# Patient Record
Sex: MALE | NOT HISPANIC OR LATINO | URBAN - METROPOLITAN AREA
[De-identification: names, ages, dates, MRNs, and addresses within clinical notes are randomized per-mention and may not be internally consistent; named-entity substitution may affect disease eponyms.]

---

## 2019-04-15 ENCOUNTER — IMPORTED ENCOUNTER (OUTPATIENT)
Dept: URBAN - METROPOLITAN AREA CLINIC 1 | Facility: CLINIC | Age: 79
End: 2019-04-15

## 2019-04-15 PROBLEM — H35.371: Noted: 2019-04-15

## 2019-04-15 PROBLEM — H25.813: Noted: 2019-04-15

## 2019-04-15 PROBLEM — H43.813: Noted: 2019-04-15

## 2019-04-15 PROCEDURE — 92015 DETERMINE REFRACTIVE STATE: CPT

## 2019-04-15 PROCEDURE — 92004 COMPRE OPH EXAM NEW PT 1/>: CPT

## 2019-04-15 NOTE — PATIENT DISCUSSION
1.  Cataract OU:  Visually Significant secondary to glare discussed the risks benefits alternatives and limitations of cataract surgery. The patient stated a full understanding and a desire to proceed with the procedure. The patient will need to return for preop appointment with cataract measurements and to have any additional questions answered and start pre-operative eye drops as directed. **Not MF candidate. **Likely Myopic Goal Phaco PCL OS then OD Otherwise follow-up 6 months 10/DFE/glare 2. Epiretinal Membrane OD - Observe for change. 3. PVD OU - RD precautions. Return for an appointment for Ascan/H and P. with Dr. Sarah Braun.

## 2019-04-29 ENCOUNTER — IMPORTED ENCOUNTER (OUTPATIENT)
Dept: URBAN - METROPOLITAN AREA CLINIC 1 | Facility: CLINIC | Age: 79
End: 2019-04-29

## 2019-04-29 PROBLEM — H25.813: Noted: 2019-04-29

## 2019-04-29 PROCEDURE — 92136 OPHTHALMIC BIOMETRY: CPT

## 2019-04-29 NOTE — PATIENT DISCUSSION
1. Cataract OU:  Visually Significant secondary to glare discussed the risks benefits alternatives and limitations of cataract surgery. The patient stated a full understanding and a desire to proceed with the procedure. Instructed/ discussed with patient if gtts are expensive (over 150 dollars) to call our office so we can recommend alternatives. Pt understood. Pt understands they will need glasses post-op to achieve their best corrected vision. Pt requests myopic target. Phaco PCL OS then OD. 2.  Epiretinal Membrane OD - Observe for change. 3. PVD OU - RD precautions.

## 2019-05-08 ENCOUNTER — IMPORTED ENCOUNTER (OUTPATIENT)
Dept: URBAN - METROPOLITAN AREA CLINIC 1 | Facility: CLINIC | Age: 79
End: 2019-05-08

## 2019-05-09 ENCOUNTER — IMPORTED ENCOUNTER (OUTPATIENT)
Dept: URBAN - METROPOLITAN AREA CLINIC 1 | Facility: CLINIC | Age: 79
End: 2019-05-09

## 2019-05-09 PROBLEM — Z96.1: Noted: 2019-05-09

## 2019-05-09 PROCEDURE — 99024 POSTOP FOLLOW-UP VISIT: CPT

## 2019-05-09 NOTE — PATIENT DISCUSSION
POD#1 CE/IOL OS (Toric) doing well. *Myopic Goal. 1 gtt Combigan instilled  Continue all 3 gtts as prescribed and until gone. Use Ilevro BID OS Ilevro Qdaily OS Ocuflox TID OS Post op Warnings Reiterated RTC as scheduled

## 2019-05-16 ENCOUNTER — IMPORTED ENCOUNTER (OUTPATIENT)
Dept: URBAN - METROPOLITAN AREA CLINIC 1 | Facility: CLINIC | Age: 79
End: 2019-05-16

## 2019-05-16 PROBLEM — H25.811: Noted: 2019-05-16

## 2019-05-16 PROCEDURE — 92136 OPHTHALMIC BIOMETRY: CPT

## 2019-05-16 NOTE — PATIENT DISCUSSION
1.  Cataract OD: Visually Significant secondary to glare discussed the risks benefits alternatives and limitations of cataract surgery. The patient stated a full understanding and a desire to proceed with the procedure. Pt understands they will need glasses post-op to achieve their best corrected vision. Guarded prognosis due to h/o ERM OD. Phaco PCL OD Pt understands they will need glasses post-op to achieve their best corrected vision. 2.  POW#2  CE/IOL OS (Toric) doing well.   Use Inveltys BID OS till out Use Ilevro Qdaily OS till out F/u as scheduled 2nd eye

## 2019-05-22 ENCOUNTER — IMPORTED ENCOUNTER (OUTPATIENT)
Dept: URBAN - METROPOLITAN AREA CLINIC 1 | Facility: CLINIC | Age: 79
End: 2019-05-22

## 2019-05-23 ENCOUNTER — IMPORTED ENCOUNTER (OUTPATIENT)
Dept: URBAN - METROPOLITAN AREA CLINIC 1 | Facility: CLINIC | Age: 79
End: 2019-05-23

## 2019-05-23 PROBLEM — Z96.1: Noted: 2019-05-23

## 2019-05-23 PROCEDURE — 99024 POSTOP FOLLOW-UP VISIT: CPT

## 2019-05-23 NOTE — PATIENT DISCUSSION
1. POD#1 Phaco/ PCL OD (Toric)- doing well. *Mid-Goal* Continue all 3 gtts as prescribed and until gone. Use Inveltys BID OD Ilevro Qdaily OD Ocuflox TID OD 1 gtt Combigan applied OD Post op Warnings Reiterated 2. POW#2 Phaco/ PCL OS (Toric)- doing well. *Myopic Goal* Continue all 3 gtts as prescribed and until gone. Use Inveltys BID OS till out Use Ilevro Qdaily OS till out Post op Warnings Reiterated RTC as scheduled

## 2019-06-13 ENCOUNTER — IMPORTED ENCOUNTER (OUTPATIENT)
Dept: URBAN - METROPOLITAN AREA CLINIC 1 | Facility: CLINIC | Age: 79
End: 2019-06-13

## 2019-06-13 PROBLEM — Z09: Noted: 2019-06-13

## 2019-06-13 PROCEDURE — 99024 POSTOP FOLLOW-UP VISIT: CPT

## 2019-06-13 NOTE — PATIENT DISCUSSION
POW#3 Phaco/ PCL OU (Toric OU) -- Doing well. **OD- Mid-Range Goal / OS- Myopic Goal** Finish PO meds per schedule MRX for glasses given to patient today @ [facility]. Return for an appointment in February 2020 for a 30 OU with Dr. Torrey Whelan.

## 2019-08-16 ENCOUNTER — IMPORTED ENCOUNTER (OUTPATIENT)
Dept: URBAN - METROPOLITAN AREA CLINIC 1 | Facility: CLINIC | Age: 79
End: 2019-08-16

## 2019-08-16 PROBLEM — H10.501: Noted: 2019-08-16

## 2019-08-16 PROBLEM — H01.001: Noted: 2019-08-16

## 2019-08-16 PROBLEM — H01.004: Noted: 2019-08-16

## 2019-08-16 PROCEDURE — 92012 INTRM OPH EXAM EST PATIENT: CPT

## 2019-08-16 NOTE — PATIENT DISCUSSION
1.  Blepharoconjunctivitis OD vs. early Viral Conjunctivitis OD: Likely Blepharoconjunctivitis as patient reports this condition has been recurrent throughout his life - Begin Tobradex BID OD (Rx sent to patient's pharm) Begin Hot compresses OU x5 minutes daily. Begin Baby Shampoo Lid Scrubs (Use OU) 2. Anterior Blepharitis OU - Begin Daily Hot compresses and lid scrubs were recommended. 3. POM#3 Phaco/ PCL OU (Toric OU) -- Doing well. **OD- Mid-Range Goal / OS- Myopic Goal** 4.  H/o Epiretinal Membrane OD 5. H/o PVD OU Return for an appointment in 1-2 weeks 10 (blepharoconj vs viral recheck) with Dr. Micaela Ponce.

## 2019-08-23 ENCOUNTER — IMPORTED ENCOUNTER (OUTPATIENT)
Dept: URBAN - METROPOLITAN AREA CLINIC 1 | Facility: CLINIC | Age: 79
End: 2019-08-23

## 2019-08-23 PROBLEM — H10.501: Noted: 2019-08-23

## 2019-08-23 PROCEDURE — 92012 INTRM OPH EXAM EST PATIENT: CPT

## 2019-08-23 NOTE — PATIENT DISCUSSION
1.  Blepharoconjunctivitis OD: Resolved. Continue Tobradex BID OD x1 more week then d/c OD. Continue Hot compresses QDaily-BID OU x5 minutes routinely/daily. Switch Baby Shampoo Lid Scrubs to Ocusoft Hypochlor spray lid scrubs Qdaily to eyelids OU. 2.  Anterior Blepharitis OU -  Continue Hot compresses OU x5 minutes daily. Switch Baby Shampoo Lid Scrubs to Ocusoft Hypochlor spray lid scrubs Qdaily to eyelids OU. 3.  POM#3 Phaco/ PCL OU (Toric OU)  **OD- Mid-Range Goal / OS- Myopic Goal** 4.  H/o Epiretinal Membrane OD 5. H/o PVD OUReturn for an appointment as scheduled with Dr. Natividad Broussard.

## 2020-08-13 ENCOUNTER — IMPORTED ENCOUNTER (OUTPATIENT)
Dept: URBAN - METROPOLITAN AREA CLINIC 1 | Facility: CLINIC | Age: 80
End: 2020-08-13

## 2020-08-13 PROBLEM — H26.491: Noted: 2020-08-13

## 2020-08-13 PROBLEM — H04.123: Noted: 2020-08-13

## 2020-08-13 PROBLEM — H01.001: Noted: 2020-08-13

## 2020-08-13 PROBLEM — H01.004: Noted: 2020-08-13

## 2020-08-13 PROBLEM — H16.143: Noted: 2020-08-13

## 2020-08-13 PROBLEM — H35.371: Noted: 2020-08-13

## 2020-08-13 PROCEDURE — 92014 COMPRE OPH EXAM EST PT 1/>: CPT

## 2020-08-13 NOTE — PATIENT DISCUSSION
1.  Epiretinal Membrane OD - Stable. Observe for change. 2. Anterior Blepharitis OU - Daily hot compresses OU x 5 minutes daily. Switch Baby Shampoo Lid Scrubs to Ocusoft Hypochlor spray lid scrubs Qdaily to eyelids OU. 3.  KAREN w/ PEK OU-The use/continuation of artificial tears were recommended. 4.  PCO  OD (Posterior Capsule Opacification)  Observe and consider yag cap when pt feels pco visually significant and visual acuity decreases to appropriate level. 5. Pseudophakia OU -- (Toric OU)  OD- Mid-Range Goal / OS- Myopic Goal 6. PVD OU -- RD precautions. Patient defers Mrx today. Return for an appointment in 1 year for a 30/glare with Dr. Tomeka Lafleur.

## 2020-11-23 NOTE — PATIENT DISCUSSION
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  Now referred to me for right eye involvement.

## 2020-11-23 NOTE — PATIENT DISCUSSION
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  The patient's right eye has not  yet responded 1 week of steroid drops.  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  Recommend an aqueous biopsy/pathology vs vitreous tap to determine etiology.   I would like to consult Dr. Gail Hernández at [facility].

## 2020-11-23 NOTE — PATIENT DISCUSSION
PLAN: Continue Durezol tid OD for now.  Continue COSOPT BID .  Will do a limited anterior uveitis/iridocyclitis workup.

## 2020-12-14 NOTE — PATIENT DISCUSSION
12/14/20: Patient saw Dr. Misty Cast at [facility]. 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 Patient scheduled for MRI and biopsy OD soon. Patient is stable, continue Cosopt and Durezol. Per Dr. Misty Cast patient will need Cat sx in OD within 1 yr.

## 2020-12-14 NOTE — PATIENT DISCUSSION
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  The patient's right eye has not  yet responded 1 week of steroid drops.  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  Recommend an aqueous biopsy/pathology vs vitreous tap to determine etiology.   I would like to consult Dr. Yakov Beltran at [facility].

## 2021-01-07 NOTE — PROCEDURE NOTE: CLINICAL
PROCEDURE NOTE: Punctal Plugs, Arlidarrin Bread ([id number], [id number]) OU. Diagnosis: Dry Eye Syndrome. Prior to treatment, the risks/benefits/alternatives were discussed. The patient wished to proceed with procedure. Temporary collagen plugs were inserted. Patient tolerated procedure well. There were no complications. Post procedure instructions given. Ac Corley

## 2021-05-11 ENCOUNTER — IMPORTED ENCOUNTER (OUTPATIENT)
Dept: URBAN - METROPOLITAN AREA CLINIC 1 | Facility: CLINIC | Age: 81
End: 2021-05-11

## 2021-05-11 PROBLEM — H04.123: Noted: 2021-05-11

## 2021-05-11 PROBLEM — H16.143: Noted: 2021-05-11

## 2021-05-11 PROCEDURE — 99213 OFFICE O/P EST LOW 20 MIN: CPT

## 2021-05-11 NOTE — PATIENT DISCUSSION
1.  KAREN w/ PEK OU-The use/continuation of artificial tears were recommended. 2.  Scleral Thinning OD- physiologic. Reassurance given. 3.  Anterior Blepharitis OU - Daily hot compresses OU x 5 minutes daily. Switch Baby Shampoo Lid Scrubs to Ocusoft Hypochlor spray lid scrubs Qdaily to eyelids OU. 4.  PCO  OD (Posterior Capsule Opacification)  Observe and consider yag cap when pt feels pco visually significant and visual acuity decreases to appropriate level. 5. Pseudophakia OU -- (Toric OU)  OD- Mid-Range Goal / OS- Myopic Goal 6. H/o Epiretinal Membrane OD. 7. H/o PVD OU  Return for an appointment as scheduled with Dr. Av Aiken.

## 2021-09-30 ENCOUNTER — IMPORTED ENCOUNTER (OUTPATIENT)
Dept: URBAN - METROPOLITAN AREA CLINIC 1 | Facility: CLINIC | Age: 81
End: 2021-09-30

## 2021-09-30 PROBLEM — Z96.1: Noted: 2021-09-30

## 2021-09-30 PROBLEM — H26.491: Noted: 2021-09-30

## 2021-09-30 PROBLEM — H04.123: Noted: 2021-09-30

## 2021-09-30 PROBLEM — H16.143: Noted: 2021-09-30

## 2021-09-30 PROCEDURE — 99214 OFFICE O/P EST MOD 30 MIN: CPT

## 2021-09-30 NOTE — PATIENT DISCUSSION
1.  KAREN w/ PEK OU- Recommend ATs TID OU routinely 2. PCO  OD (Posterior Capsule Opacification)  Observe and consider yag cap when pt feels pco visually significant and visual acuity decreases to appropriate level. 3. Pseudophakia OU - (Toric OU)  OD- Mid-Range Goal / OS- Myopic Goal 4. Epiretinal Membrane OD - Stable. Observe for change. 5. Anterior Blepharitis OU - Daily hot compresses OU x 5 minutes daily. Switch Baby Shampoo Lid Scrubs to Ocusoft Hypochlor spray lid scrubs Qdaily to eyelids OU. 6.  PVD OU - RD precautions. Patient deferred [facility] Rx today. Return for an appointment in 1 year 27 with Dr. Claudeen Cobble.

## 2021-10-13 NOTE — PATIENT DISCUSSION
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  The patient's right eye has not  yet responded 1 week of steroid drops.  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  Recommend an aqueous biopsy/pathology vs vitreous tap to determine etiology.   I would like to consult Dr. Elisha Bruner at [facility].

## 2021-10-13 NOTE — PATIENT DISCUSSION
Medical clearance needed from Oncologist (Dr. Heidy Alberts in [place] [address] [facility]. [phone] ) , no transportation. +Sep gtts.

## 2021-10-13 NOTE — PATIENT DISCUSSION
01/07/2021: MRI results from 12/16/20 @[facility] showed Heterogeneous calvarial marrow signal w/out circumscribed focal enhancing lesion. Patient lowered to BID on durezol. Continue at BID for one more week then decrease to qday.   I spoke with Dr. Ofelia Murillo on the phone today.  He recommends diagnostic vitrectomy +/- cataract surgery (same day).  Pt has upcoming appt with Dr. Ofelia Murillo.

## 2021-10-13 NOTE — PATIENT DISCUSSION
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 After risks, benefits, and alternatives were discussed, the patient would like to proceed with punctual plugging of right lower puncti and left upper.

## 2021-10-13 NOTE — PATIENT DISCUSSION
10/12/2021: Patient is receiving infusions Monoclonal antibodies for CLL monthly. Continuing through the end of the year and then will be on an oral medications. Monitor.

## 2021-10-13 NOTE — PROCEDURE NOTE: CLINICAL
PROCEDURE NOTE: Punctal Plugs, Rosalee Ravi ([id number], [id number]) OU. Diagnosis: Dry Eye Syndrome. Prior to treatment, the risks/benefits/alternatives were discussed. The patient wished to proceed with procedure. Temporary collagen plugs were inserted. Patient tolerated procedure well. There were no complications. Post procedure instructions given. Romeo Gresham

## 2021-10-13 NOTE — PATIENT DISCUSSION
12/14/20: Patient saw Dr. Nikki Jarrett at [facility]. 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 Patient scheduled for MRI and biopsy OD soon. Patient is stable, continue Cosopt and Durezol. Per Dr. Nikki Jarrett patient will need Cat sx in OD within 1 yr.

## 2021-12-17 NOTE — PATIENT DISCUSSION
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  The patient's right eye has not  yet responded 1 week of steroid drops.  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  Recommend an aqueous biopsy/pathology vs vitreous tap to determine etiology.   I would like to consult Dr. Clarissa Clark at [facility].

## 2021-12-17 NOTE — PATIENT DISCUSSION
Patient understands condition, prognosis and need for follow up care. post procedure radiography not performed

## 2021-12-17 NOTE — PATIENT DISCUSSION
01/07/2021: MRI results from 12/16/20 @[facility] showed Heterogeneous calvarial marrow signal w/out circumscribed focal enhancing lesion. Patient lowered to BID on durezol. Continue at BID for one more week then decrease to qday.   I spoke with Dr. Reginaldo Sorto on the phone today.  He recommends diagnostic vitrectomy +/- cataract surgery (same day).  Pt has upcoming appt with Dr. Reginaldo Sorto.

## 2021-12-17 NOTE — PATIENT DISCUSSION
12/14/20: Patient saw Dr. Meg Byrd at [facility]. 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 Patient scheduled for MRI and biopsy OD soon. Patient is stable, continue Cosopt and Durezol. Per Dr. Meg Byrd patient will need Cat sx in OD within 1 yr.

## 2021-12-17 NOTE — PATIENT DISCUSSION
Medical clearance needed from Oncologist (Dr. Ailyn Hull in [place] [address] [facility]. [phone] ) , no transportation. +Sep gtts.

## 2021-12-17 NOTE — PATIENT DISCUSSION
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  The patient's right eye has not  yet responded 1 week of steroid drops.  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  Recommend an aqueous biopsy/pathology vs vitreous tap to determine etiology.   I would like to consult Dr. Jarod Gaston at [facility].

## 2021-12-17 NOTE — PATIENT DISCUSSION
Medical clearance needed from Oncologist (Dr. Rojas Breen in [place] [address] [facility]. [phone] ) , no transportation. +Sep gtts.

## 2021-12-17 NOTE — PATIENT DISCUSSION
12/14/20: Patient saw Dr. Meg Byrd at [facility]. 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 Patient scheduled for MRI and biopsy OD soon. Patient is stable, continue Cosopt and Durezol. Per Dr. Meg Byrd patient will need Cat sx in OD within 1 yr.

## 2021-12-17 NOTE — PATIENT DISCUSSION
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  The patient's right eye has not  yet responded 1 week of steroid drops.  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  Recommend an aqueous biopsy/pathology vs vitreous tap to determine etiology.   I would like to consult Dr. Reji Elizabeth at [facility].

## 2021-12-17 NOTE — PATIENT DISCUSSION
12/14/20: Patient saw Dr. Keisha Harris at [facility]. 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 Patient scheduled for MRI and biopsy OD soon. Patient is stable, continue Cosopt and Durezol. Per Dr. Keisha Harris patient will need Cat sx in OD within 1 yr.

## 2021-12-17 NOTE — PATIENT DISCUSSION
01/07/2021: MRI results from 12/16/20 @[facility] showed Heterogeneous calvarial marrow signal w/out circumscribed focal enhancing lesion. Patient lowered to BID on durezol. Continue at BID for one more week then decrease to qday.   I spoke with Dr. Tu Silver on the phone today.  He recommends diagnostic vitrectomy +/- cataract surgery (same day).  Pt has upcoming appt with Dr. Tu Silver.

## 2021-12-17 NOTE — PATIENT DISCUSSION
01/07/2021: MRI results from 12/16/20 @[facility] showed Heterogeneous calvarial marrow signal w/out circumscribed focal enhancing lesion. Patient lowered to BID on durezol. Continue at BID for one more week then decrease to qday.   I spoke with Dr. Cassy Nam on the phone today.  He recommends diagnostic vitrectomy +/- cataract surgery (same day).  Pt has upcoming appt with Dr. Cassy Nam.

## 2021-12-21 NOTE — PATIENT DISCUSSION
01/07/2021: MRI results from 12/16/20 @[facility] showed Heterogeneous calvarial marrow signal w/out circumscribed focal enhancing lesion. Patient lowered to BID on durezol. Continue at BID for one more week then decrease to qday.   I spoke with Dr. Osorio Oliver on the phone today.  He recommends diagnostic vitrectomy +/- cataract surgery (same day).  Pt has upcoming appt with Dr. Osorio Oliver.

## 2021-12-21 NOTE — PATIENT DISCUSSION
12/14/20: Patient saw Dr. Misty Cast at [facility]. 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 Patient scheduled for MRI and biopsy OD soon. Patient is stable, continue Cosopt and Durezol. Per Dr. Misty Cast patient will need Cat sx in OD within 1 yr.

## 2021-12-21 NOTE — PATIENT DISCUSSION
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  The patient's right eye has not  yet responded 1 week of steroid drops.  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  Recommend an aqueous biopsy/pathology vs vitreous tap to determine etiology.   I would like to consult Dr. Lake Garcia at [facility].

## 2021-12-21 NOTE — PATIENT DISCUSSION
Medical clearance needed from Oncologist (Dr. Jm Francisco in [place] [address] [facility]. [phone] ) , no transportation. +Sep gtts.

## 2022-01-25 NOTE — PATIENT DISCUSSION
01/07/2021: MRI results from 12/16/20 @[facility] showed Heterogeneous calvarial marrow signal w/out circumscribed focal enhancing lesion. Patient lowered to BID on durezol. Continue at BID for one more week then decrease to qday.   I spoke with Dr. Ata Hernandez on the phone today.  He recommends diagnostic vitrectomy +/- cataract surgery (same day).  Pt has upcoming appt with Dr. Ata Hernandez.

## 2022-01-25 NOTE — PATIENT DISCUSSION
Medical clearance needed from Oncologist (Dr. Neel Johnson in [place] [address] [facility]. [phone] ) , no transportation. +Sep gtts.

## 2022-01-25 NOTE — PATIENT DISCUSSION
12/14/20: Patient saw Dr. Foster Tobar at [facility]. 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 Patient scheduled for MRI and biopsy OD soon. Patient is stable, continue Cosopt and Durezol. Per Dr. Foster Tobar patient will need Cat sx in OD within 1 yr.

## 2022-01-25 NOTE — PATIENT DISCUSSION
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  The patient's right eye has not  yet responded 1 week of steroid drops.  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  Recommend an aqueous biopsy/pathology vs vitreous tap to determine etiology.   I would like to consult Dr. Elisha Bruner at [facility].

## 2022-02-25 NOTE — PATIENT DISCUSSION
01/07/2021: MRI results from 12/16/20 @[facility] showed Heterogeneous calvarial marrow signal w/out circumscribed focal enhancing lesion. Patient lowered to BID on durezol. Continue at BID for one more week then decrease to qday.   I spoke with Dr. Luz Alves on the phone today.  He recommends diagnostic vitrectomy +/- cataract surgery (same day).  Pt has upcoming appt with Dr. Luz Alves.

## 2022-02-25 NOTE — PATIENT DISCUSSION
The patient feels that the cataract is significantly impacting daily activities and has elected cataract surgery. The risks, benefits, and alternatives to surgery were discussed. The patient elects to proceed with surgery. Plan;: OS Basic Norma.

## 2022-02-25 NOTE — PATIENT DISCUSSION
12/14/20: Patient saw Dr. Loyd Ladd at [facility]. 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 Patient scheduled for MRI and biopsy OD soon. Patient is stable, continue Cosopt and Durezol. Per Dr. Loyd Ladd patient will need Cat sx in OD within 1 yr.

## 2022-02-25 NOTE — PATIENT DISCUSSION
Medical clearance needed from Oncologist (Dr. Rao Roberto in [place] [address] [facility]. [phone] ) , no transportation. +Sep gtts.

## 2022-02-25 NOTE — PATIENT DISCUSSION
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  The patient's right eye has not  yet responded 1 week of steroid drops.  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  Recommend an aqueous biopsy/pathology vs vitreous tap to determine etiology.   I would like to consult Dr. Liana Perkins at [facility].

## 2022-03-04 NOTE — PATIENT DISCUSSION
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  The patient's right eye has not  yet responded 1 week of steroid drops.  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  Recommend an aqueous biopsy/pathology vs vitreous tap to determine etiology.   I would like to consult Dr. Clarissa Clark at [facility].

## 2022-03-04 NOTE — PATIENT DISCUSSION
12/14/20: Patient saw Dr. Breanna Palacio at [facility]. 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 Patient scheduled for MRI and biopsy OD soon. Patient is stable, continue Cosopt and Durezol. Per Dr. Breanna Palacio patient will need Cat sx in OD within 1 yr.

## 2022-03-04 NOTE — PATIENT DISCUSSION
12/14/20: Patient saw Dr. Maura Perla at [facility]. 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 Patient scheduled for MRI and biopsy OD soon. Patient is stable, continue Cosopt and Durezol. Per Dr. Maura Perla patient will need Cat sx in OD within 1 yr.

## 2022-03-04 NOTE — PATIENT DISCUSSION
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  The patient's right eye has not  yet responded 1 week of steroid drops.  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  Recommend an aqueous biopsy/pathology vs vitreous tap to determine etiology.   I would like to consult Dr. Fortunato Gandhi at [facility].

## 2022-03-04 NOTE — PATIENT DISCUSSION
01/07/2021: MRI results from 12/16/20 @[facility] showed Heterogeneous calvarial marrow signal w/out circumscribed focal enhancing lesion. Patient lowered to BID on durezol. Continue at BID for one more week then decrease to qday.   I spoke with Dr. Eloise Fernandes on the phone today.  He recommends diagnostic vitrectomy +/- cataract surgery (same day).  Pt has upcoming appt with Dr. Eloise Fernandes.

## 2022-03-04 NOTE — PATIENT DISCUSSION
Medical clearance needed from Oncologist (Dr. Richard Spear in [place] [address] [facility]. [phone] ) , no transportation. +Sep gtts.

## 2022-03-04 NOTE — PATIENT DISCUSSION
Medical clearance needed from Oncologist (Dr. Brandon Diaz in [place] [address] [facility]. [phone] ) , no transportation. +Sep gtts.

## 2022-03-04 NOTE — PATIENT DISCUSSION
12/14/20: Patient saw Dr. Jessica Mcintosh at [facility]. 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 Patient scheduled for MRI and biopsy OD soon. Patient is stable, continue Cosopt and Durezol. Per Dr. Jessica Mcintosh patient will need Cat sx in OD within 1 yr.

## 2022-03-04 NOTE — PATIENT DISCUSSION
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  The patient's right eye has not  yet responded 1 week of steroid drops.  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  Recommend an aqueous biopsy/pathology vs vitreous tap to determine etiology.   I would like to consult Dr. Elisha Bruner at [facility].

## 2022-03-04 NOTE — PATIENT DISCUSSION
Medical clearance needed from Oncologist (Dr. Richard Spear in [place] [address] [place] [address]. [phone] ) , no transportation. +Sep gtts.

## 2022-03-04 NOTE — PATIENT DISCUSSION
01/07/2021: MRI results from 12/16/20 @[facility] showed Heterogeneous calvarial marrow signal w/out circumscribed focal enhancing lesion. Patient lowered to BID on durezol. Continue at BID for one more week then decrease to qday.   I spoke with Dr. Melita Lux on the phone today.  He recommends diagnostic vitrectomy +/- cataract surgery (same day).  Pt has upcoming appt with Dr. Melita Lux.

## 2022-03-04 NOTE — PATIENT DISCUSSION
01/07/2021: MRI results from 12/16/20 @[facility] showed Heterogeneous calvarial marrow signal w/out circumscribed focal enhancing lesion. Patient lowered to BID on durezol. Continue at BID for one more week then decrease to qday.   I spoke with Dr. David Woods on the phone today.  He recommends diagnostic vitrectomy +/- cataract surgery (same day).  Pt has upcoming appt with Dr. David Woods.

## 2022-03-07 NOTE — PATIENT DISCUSSION
Medical clearance needed from Oncologist (Dr. Adam Vides in [place] [address] [facility]. [phone] ) , no transportation. +Sep gtts.

## 2022-03-07 NOTE — PATIENT DISCUSSION
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  The patient's right eye has not  yet responded 1 week of steroid drops.  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  Recommend an aqueous biopsy/pathology vs vitreous tap to determine etiology.   I would like to consult Dr. Mane Garg at [facility].

## 2022-03-07 NOTE — PATIENT DISCUSSION
12/14/20: [facility] 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 Patient scheduled for MRI and biopsy OD soon. Patient is stable, continue Cosopt and Durezol. Per Dr. Misty Cast patient will need Cat sx in OD within 1 yr.

## 2022-03-07 NOTE — PATIENT DISCUSSION
01/07/2021: MRI results from 12/16/20 @[facility] showed Heterogeneous calvarial marrow signal w/out circumscribed focal enhancing lesion. Patient lowered to BID on durezol. Continue at BID for one more week then decrease to qday.   I spoke with Dr. David Woods on the phone today.  He recommends diagnostic vitrectomy +/- cataract surgery (same day).  Pt has upcoming appt with Dr. David Woods.

## 2022-03-22 NOTE — PATIENT DISCUSSION
Medical clearance needed from Oncologist (Dr. Rojas Breen in [address] [phone] ) , no transportation. +Sep gtts.

## 2022-03-22 NOTE — PATIENT DISCUSSION
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  Now referred to me for right eye involvement.

## 2022-03-22 NOTE — PATIENT DISCUSSION
12/14/20: Patient saw Dr. Antonella Ventura at [facility]. 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 Patient scheduled for MRI and biopsy OD soon. Patient is stable, continue Cosopt and Durezol. Per Dr. Antonella Ventura patient will need Cat sx in OD within 1 yr.

## 2022-03-22 NOTE — PATIENT DISCUSSION
Medical clearance needed from Oncologist (Dr. Kennedy Pierson in [place] [address] [facility]. [phone] ) , no transportation. +Sep gtts.

## 2022-03-22 NOTE — PATIENT DISCUSSION
01/07/2021: MRI results from 12/16/20 @[facility] showed Heterogeneous calvarial marrow signal w/out circumscribed focal enhancing lesion. Patient lowered to BID on durezol. Continue at BID for one more week then decrease to qday.   I spoke with Dr. Breanna Palacio on the phone today.  He recommends diagnostic vitrectomy +/- cataract surgery (same day).  Pt has upcoming appt with Dr. Breanna Palacio.

## 2022-03-22 NOTE — PATIENT DISCUSSION
12/14/20: Patient saw Dr. Eloise Fernandes at [facility]. 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 Patient scheduled for MRI and biopsy OD soon. Patient is stable, continue Cosopt and Durezol. Per Dr. Eloise Fernandes patient will need Cat sx in OD within 1 yr.

## 2022-03-22 NOTE — PATIENT DISCUSSION
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  The patient's right eye has not  yet responded 1 week of steroid drops.  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  Recommend an aqueous biopsy/pathology vs vitreous tap to determine etiology.   I would like to consult Dr. Clarissa Clark at [facility].

## 2022-03-22 NOTE — PROCEDURE NOTE: SURGICAL
<p>Prior to commencing surgery patient identification, surgical procedure, site, and side were confirmed by Dr. Maryann Sims. Following topical proparacaine anesthesia, the patient was positioned at the YAG laser, a contact lens coupled to the cornea with methylcellulose and an axial posterior capsulotomy performed without complication using 4.2 Mj x 12. Excess methylcellulose was washed from the eye, one drop of Alphagan was instilled and the patient returned to the holding area having tolerated the procedure well and without complication. </p><p>MRN#[id number]</p>

## 2022-03-22 NOTE — PATIENT DISCUSSION
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  The patient's right eye has not  yet responded 1 week of steroid drops.  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  Recommend an aqueous biopsy/pathology vs vitreous tap to determine etiology.   I would like to consult Dr. Lake Garcia at [facility].

## 2022-03-22 NOTE — PATIENT DISCUSSION
01/07/2021: MRI results from 12/16/20 @[facility] showed Heterogeneous calvarial marrow signal w/out circumscribed focal enhancing lesion. Patient lowered to BID on durezol. Continue at BID for one more week then decrease to qday.   I spoke with Dr. Breanna Palacio on the phone today.  He recommends diagnostic vitrectomy +/- cataract surgery (same day).  Pt has upcoming appt with Dr. Breanna Palacio.

## 2022-03-29 NOTE — PATIENT DISCUSSION
Medical clearance needed from Oncologist (Dr. Laura Castellano in [address]. [phone] ) , no transportation. +Sep gtts.

## 2022-03-29 NOTE — PATIENT DISCUSSION
01/07/2021: MRI results from 12/16/20 @[facility] showed Heterogeneous calvarial marrow signal w/out circumscribed focal enhancing lesion. Patient lowered to BID on durezol. Continue at BID for one more week then decrease to qday.   I spoke with Dr. Ata Hernandez on the phone today.  He recommends diagnostic vitrectomy +/- cataract surgery (same day).  Pt has upcoming appt with Dr. Ata Hernandez.

## 2022-03-29 NOTE — PATIENT DISCUSSION
12/14/20: Patient saw Dr. Anastacio Valerio at [facility]. 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 Patient scheduled for MRI and biopsy OD soon. Patient is stable, continue Cosopt and Durezol. Per Dr. Anastacio Valerio patient will need Cat sx in OD within 1 yr.

## 2022-03-29 NOTE — PATIENT DISCUSSION
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  The patient's right eye has not  yet responded 1 week of steroid drops.  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  Recommend an aqueous biopsy/pathology vs vitreous tap to determine etiology.   I would like to consult Dr. Niya Severino at [facility].

## 2022-04-02 ASSESSMENT — VISUAL ACUITY
OS_CC: 20/70
OS_SC: 20/20
OS_SC: 20/25-2
OD_CC: J2
OS_SC: 20/25-2
OD_SC: 20/40+2
OD_GLARE: 20/400
OS_GLARE: 20/400
OD_SC: 20/30+1
OD_SC: 20/40
OS_SC: 20/20
OS_GLARE: 20/70
OD_CC: J2
OD_CC: 20/50-1
OS_CC: 20/100
OS_CC: 20/50-1
OS_CC: 20/50
OS_SC: 20/20
OD_GLARE: 20/100
OS_GLARE: 20/400
OD_CC: 20/40
OD_SC: 20/30
OS_SC: 20/20-1
OS_SC: 20/20
OS_CC: J1
OD_SC: 20/25
OS_PH: SC 20/25
OD_GLARE: 20/400
OD_GLARE: 20/400
OS_SC: J1
OD_SC: 20/30
OD_SC: 20/40+2
OS_CC: J1
OD_SC: 20/30-2

## 2022-04-02 ASSESSMENT — TONOMETRY
OS_IOP_MMHG: 12
OS_IOP_MMHG: 12
OS_IOP_MMHG: 15
OS_IOP_MMHG: 14
OD_IOP_MMHG: 13
OS_IOP_MMHG: 27
OD_IOP_MMHG: 14
OS_IOP_MMHG: 14
OD_IOP_MMHG: 15
OS_IOP_MMHG: 13
OD_IOP_MMHG: 13
OD_IOP_MMHG: 13
OS_IOP_MMHG: 13
OS_IOP_MMHG: 18
OD_IOP_MMHG: 18
OS_IOP_MMHG: 16
OD_IOP_MMHG: 13
OD_IOP_MMHG: 28

## 2022-06-07 NOTE — PATIENT DISCUSSION
01/07/2021: MRI results from 12/16/20 @[facility] showed Heterogeneous calvarial marrow signal w/out circumscribed focal enhancing lesion. Patient lowered to BID on durezol. Continue at BID for one more week then decrease to qday.   I spoke with Dr. Luz Alves on the phone today.  He recommends diagnostic vitrectomy +/- cataract surgery (same day).  Pt has upcoming appt with Dr. Luz Alves.

## 2022-06-07 NOTE — PATIENT DISCUSSION
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  The patient's right eye has not  yet responded 1 week of steroid drops.  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  Recommend an aqueous biopsy/pathology vs vitreous tap to determine etiology.   I would like to consult Dr. Elisha Bruner at [facility].

## 2022-06-07 NOTE — PATIENT DISCUSSION
12/14/20: Patient saw Dr. Cisco King at [facility]. 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 Patient scheduled for MRI and biopsy OD soon. Patient is stable, continue Cosopt and Durezol. Per Dr. Cisco King patient will need Cat sx in OD within 1 yr.

## 2022-06-07 NOTE — PATIENT DISCUSSION
Medical clearance needed from Oncologist (Dr. Yesika Santos in [place] [address] [facility]. [phone] ) , no transportation. +Sep gtts.

## 2022-06-20 NOTE — PATIENT DISCUSSION
01/07/2021: MRI results from 12/16/20 @[facility] showed Heterogeneous calvarial marrow signal w/out circumscribed focal enhancing lesion. Patient lowered to BID on durezol. Continue at BID for one more week then decrease to qday.   I spoke with Dr. Nikki Jarrett on the phone today.  He recommends diagnostic vitrectomy +/- cataract surgery (same day).  Pt has upcoming appt with Dr. Nikki Jarrett.

## 2022-06-20 NOTE — PATIENT DISCUSSION
01/07/2021: MRI results from 12/16/20 @[facility] showed Heterogeneous calvarial marrow signal w/out circumscribed focal enhancing lesion. Patient lowered to BID on durezol. Continue at BID for one more week then decrease to qday.   I spoke with Dr. Quinton Lobo on the phone today.  He recommends diagnostic vitrectomy +/- cataract surgery (same day).  Pt has upcoming appt with Dr. Quinton Lobo.

## 2022-06-20 NOTE — PATIENT DISCUSSION
Medical clearance needed from Oncologist (Dr. Rogerio Mcclure in [place] [address] [facility]. [phone] ) , no transportation. +Sep gtts.

## 2022-06-20 NOTE — PATIENT DISCUSSION
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  The patient's right eye has not  yet responded 1 week of steroid drops.  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  Recommend an aqueous biopsy/pathology vs vitreous tap to determine etiology.   I would like to consult Dr. Toya Spencer at [facility].

## 2022-06-20 NOTE — PATIENT DISCUSSION
Medical clearance needed from Oncologist (Dr. Niall Babin in [place] [address] [facility]. [phone] ) , no transportation. +Sep gtts.

## 2022-06-20 NOTE — PATIENT DISCUSSION
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  The patient's right eye has not  yet responded 1 week of steroid drops.  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  Recommend an aqueous biopsy/pathology vs vitreous tap to determine etiology.   I would like to consult Dr. Pinky Cevallos at [facility].

## 2022-06-20 NOTE — PROCEDURE NOTE: SURGICAL
<p>Prior to commencing surgery patient identification, surgical procedure, site, and side were confirmed by Dr. Kirti Tidwell. Following topical proparacaine anesthesia, the patient was positioned at the YAG laser, a contact lens coupled to the cornea with methylcellulose and an axial posterior capsulotomy performed without complication using 3.1 Mj x 18. Excess methylcellulose was washed from the eye, one drop of Alphagan was instilled and the patient returned to the holding area having tolerated the procedure well and without complication. </p><p>MRN: [id number]</p>

## 2022-06-20 NOTE — PATIENT DISCUSSION
12/14/20: Patient saw Dr. Matthew Rodriguez at [facility]. 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 Patient scheduled for MRI and biopsy OD soon. Patient is stable, continue Cosopt and Durezol. Per Dr. Matthew Rodriguez patient will need Cat sx in OD within 1 yr.

## 2022-06-20 NOTE — PATIENT DISCUSSION
12/14/20: Patient saw Dr. Jessica Mcintosh at [facility]. 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 Patient scheduled for MRI and biopsy OD soon. Patient is stable, continue Cosopt and Durezol. Per Dr. Jessica Mcintosh patient will need Cat sx in OD within 1 yr.

## 2022-06-27 NOTE — PATIENT DISCUSSION
12/14/20: Patient saw Dr. Zulma Beach at [facility]. 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 Patient scheduled for MRI and biopsy OD soon. Patient is stable, continue Cosopt and Durezol. Per Dr. Zulma Beach patient will need Cat sx in OD within 1 yr.

## 2022-06-27 NOTE — PATIENT DISCUSSION
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  The patient's right eye has not  yet responded 1 week of steroid drops.  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  Recommend an aqueous biopsy/pathology vs vitreous tap to determine etiology.   I would like to consult Dr. Liana Perkins at [facility].

## 2022-06-27 NOTE — PATIENT DISCUSSION
01/07/2021: MRI results from 12/16/20 @[facility] showed Heterogeneous calvarial marrow signal w/out circumscribed focal enhancing lesion. Patient lowered to BID on durezol. Continue at BID for one more week then decrease to qday.   I spoke with Dr. Eloise Fernandes on the phone today.  He recommends diagnostic vitrectomy +/- cataract surgery (same day).  Pt has upcoming appt with Dr. Eloise Fernandes.

## 2022-10-03 ENCOUNTER — COMPREHENSIVE EXAM (OUTPATIENT)
Dept: URBAN - METROPOLITAN AREA CLINIC 1 | Facility: CLINIC | Age: 82
End: 2022-10-03

## 2022-10-03 DIAGNOSIS — Z96.1: ICD-10-CM

## 2022-10-03 DIAGNOSIS — H26.491: ICD-10-CM

## 2022-10-03 DIAGNOSIS — H01.024: ICD-10-CM

## 2022-10-03 DIAGNOSIS — H35.371: ICD-10-CM

## 2022-10-03 DIAGNOSIS — H01.021: ICD-10-CM

## 2022-10-03 DIAGNOSIS — H16.143: ICD-10-CM

## 2022-10-03 DIAGNOSIS — H04.123: ICD-10-CM

## 2022-10-03 PROCEDURE — 92015 DETERMINE REFRACTIVE STATE: CPT

## 2022-10-03 PROCEDURE — 99214 OFFICE O/P EST MOD 30 MIN: CPT

## 2022-10-03 ASSESSMENT — VISUAL ACUITY
OD_BAT: 20/100
OD_CC: 20/25-2
OS_BAT: 20/70
OS_CC: 20/20

## 2022-10-03 ASSESSMENT — TONOMETRY
OS_IOP_MMHG: 13
OD_IOP_MMHG: 13

## 2023-01-04 NOTE — PATIENT DISCUSSION
12/14/20: Patient saw Dr. Elizabeth Moses at [facility]. 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 Patient scheduled for MRI and biopsy OD soon. Patient is stable, continue Cosopt and Durezol. Per Dr. Elizabeth Moses patient will need Cat sx in OD within 1 yr.

## 2023-01-04 NOTE — PATIENT DISCUSSION
01/07/2021: MRI results from 12/16/20 @[facility] showed Heterogeneous calvarial marrow signal w/out circumscribed focal enhancing lesion. Patient lowered to BID on durezol. Continue at BID for one more week then decrease to qday.   I spoke with Dr. Maura Perla on the phone today.  He recommends diagnostic vitrectomy +/- cataract surgery (same day).  Pt has upcoming appt with Dr. Maura Perla.

## 2023-01-04 NOTE — PATIENT DISCUSSION
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  The patient's right eye has not  yet responded 1 week of steroid drops.  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  Recommend an aqueous biopsy/pathology vs vitreous tap to determine etiology.   I would like to consult Dr. Clarissa Clark at [facility].

## 2023-10-06 ENCOUNTER — COMPREHENSIVE EXAM (OUTPATIENT)
Dept: URBAN - METROPOLITAN AREA CLINIC 1 | Facility: CLINIC | Age: 83
End: 2023-10-06

## 2023-10-06 DIAGNOSIS — H26.491: ICD-10-CM

## 2023-10-06 DIAGNOSIS — Z96.1: ICD-10-CM

## 2023-10-06 DIAGNOSIS — H01.021: ICD-10-CM

## 2023-10-06 DIAGNOSIS — H04.123: ICD-10-CM

## 2023-10-06 DIAGNOSIS — H01.024: ICD-10-CM

## 2023-10-06 DIAGNOSIS — H35.371: ICD-10-CM

## 2023-10-06 DIAGNOSIS — H16.143: ICD-10-CM

## 2023-10-06 DIAGNOSIS — H43.813: ICD-10-CM

## 2023-10-06 PROCEDURE — 99214 OFFICE O/P EST MOD 30 MIN: CPT

## 2023-10-06 PROCEDURE — 92015 DETERMINE REFRACTIVE STATE: CPT

## 2023-10-06 ASSESSMENT — VISUAL ACUITY
OD_BAT: 20/100
OU_SC: J1+
OS_CC: 20/20
OS_BAT: 20/70
OD_CC: 20/30-2

## 2023-10-06 ASSESSMENT — TONOMETRY
OD_IOP_MMHG: 15
OS_IOP_MMHG: 15

## 2024-04-01 NOTE — PATIENT DISCUSSION
01/07/2021: MRI results from 12/16/20 @[facility] showed Heterogeneous calvarial marrow signal w/out circumscribed focal enhancing lesion. Patient lowered to BID on durezol. Continue at BID for one more week then decrease to qday.   I spoke with Dr. Breanna Palacio on the phone today.  He recommends diagnostic vitrectomy +/- cataract surgery (same day).  Pt has upcoming appt with Dr. Breanna Palacio.
12/14/20: Patient saw Dr. Echo Fam at [facility]. 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 Patient scheduled for MRI and biopsy OD soon. Patient is stable, continue Cosopt and Durezol. Per Dr. Echo Fam patient will need Cat sx in OD within 1 yr.
Cataract symptoms i.e., glare, blur discussed. Pt to call if worsening vision or trouble with driving, TV, reading, ADL. UV precautions. Reviewed possibility of future cataract surgery.
Continue Cosopt bid OU.
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  The patient's right eye has not  yet responded 1 week of steroid drops.  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  Recommend an aqueous biopsy/pathology vs vitreous tap to determine etiology.   I would like to consult Dr. Thi Odell at [facility].
Discussed lid hygiene, warm compress and eyelid wash.
Good postoperative appearance.
Monitor.
No Retinal holes, Tears or Detachments.
PLAN: Continue Durezol tid OD for now.  Continue COSOPT BID .  Will do a limited anterior uveitis/iridocyclitis workup.
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  Now referred to me for right eye involvement.
Patient made aware of 24/7 emergency services.
Patient understands condition, prognosis and need for follow up care.
Recommended artificial tears to use: 1 drop 4x a day in both eyes.
Recommended warm compresses.
Retinal tear and detachment warning symptoms reviewed and patient instructed to call immediately if increasing floaters, flashes, or decreasing peripheral vision.
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 After risks, benefits, and alternatives were discussed, the patient would like to proceed with punctual plugging of bilateral lower puncti.
return this week for dilation OS, cannot today due to driving.
No

## 2024-10-07 ENCOUNTER — COMPREHENSIVE EXAM (OUTPATIENT)
Dept: URBAN - METROPOLITAN AREA CLINIC 1 | Facility: CLINIC | Age: 84
End: 2024-10-07

## 2024-10-07 DIAGNOSIS — H04.123: ICD-10-CM

## 2024-10-07 DIAGNOSIS — H26.491: ICD-10-CM

## 2024-10-07 DIAGNOSIS — H16.143: ICD-10-CM

## 2024-10-07 DIAGNOSIS — H35.371: ICD-10-CM

## 2024-10-07 PROCEDURE — 99214 OFFICE O/P EST MOD 30 MIN: CPT

## 2024-10-07 PROCEDURE — 92015 DETERMINE REFRACTIVE STATE: CPT
